# Patient Record
Sex: FEMALE | URBAN - METROPOLITAN AREA
[De-identification: names, ages, dates, MRNs, and addresses within clinical notes are randomized per-mention and may not be internally consistent; named-entity substitution may affect disease eponyms.]

---

## 2023-03-06 ENCOUNTER — OFFICE VISIT (OUTPATIENT)
Dept: URBAN - METROPOLITAN AREA CLINIC 89 | Facility: CLINIC | Age: 58
End: 2023-03-06
Payer: COMMERCIAL

## 2023-03-06 DIAGNOSIS — H11.153 PINGUECULA, BILATERAL: ICD-10-CM

## 2023-03-06 DIAGNOSIS — H25.13 AGE-RELATED NUCLEAR CATARACT, BILATERAL: ICD-10-CM

## 2023-03-06 DIAGNOSIS — E11.9 DIABETES MELLITUS TYPE 2 WITHOUT MENTION OF COMPLICATION: Primary | ICD-10-CM

## 2023-03-06 DIAGNOSIS — H16.142 PUNCTATE KERATITIS OF LEFT EYE: ICD-10-CM

## 2023-03-06 DIAGNOSIS — H52.223 REGULAR ASTIGMATISM, BILATERAL: ICD-10-CM

## 2023-03-06 PROCEDURE — 92004 COMPRE OPH EXAM NEW PT 1/>: CPT | Performed by: OPTOMETRIST

## 2023-03-06 ASSESSMENT — INTRAOCULAR PRESSURE
OS: 13
OD: 14

## 2023-03-06 NOTE — IMPRESSION/PLAN
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 Plan: Patient has dry eye due to inadequate aqueous layer of tears. Patient is advised to start Systane Ultra QID. Patient was educated regarding the rebound drying effect of ERENDIRA and therefore Systane Ultra is strongly recommended.

## 2023-03-06 NOTE — IMPRESSION/PLAN
Impression: Diabetes mellitus Type 2 without mention of complication: G25.1. Plan: Patient educated regarding pathophysiology of DM and that DM is the leading cause of preventable blindness in adults. Educated patient regarding the importance of good blood sugar control and A1c monitoring. RTC 1 year else PRN.